# Patient Record
Sex: MALE | Race: WHITE
[De-identification: names, ages, dates, MRNs, and addresses within clinical notes are randomized per-mention and may not be internally consistent; named-entity substitution may affect disease eponyms.]

---

## 2019-07-04 ENCOUNTER — HOSPITAL ENCOUNTER (EMERGENCY)
Dept: HOSPITAL 75 - ER | Age: 29
Discharge: HOME | End: 2019-07-04
Payer: SELF-PAY

## 2019-07-04 VITALS — SYSTOLIC BLOOD PRESSURE: 138 MMHG | DIASTOLIC BLOOD PRESSURE: 71 MMHG

## 2019-07-04 VITALS — BODY MASS INDEX: 22.5 KG/M2 | HEIGHT: 66 IN | WEIGHT: 140 LBS

## 2019-07-04 DIAGNOSIS — F17.200: ICD-10-CM

## 2019-07-04 DIAGNOSIS — Z90.49: ICD-10-CM

## 2019-07-04 DIAGNOSIS — S52.501A: ICD-10-CM

## 2019-07-04 DIAGNOSIS — R40.2362: ICD-10-CM

## 2019-07-04 DIAGNOSIS — R40.2142: ICD-10-CM

## 2019-07-04 DIAGNOSIS — S42.022A: ICD-10-CM
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
## 2019-07-04 DIAGNOSIS — R40.2252: ICD-10-CM

## 2019-07-04 DIAGNOSIS — S62.114A: Primary | ICD-10-CM

## 2019-07-04 DIAGNOSIS — S61.412A: ICD-10-CM

## 2019-07-04 LAB
ALBUMIN SERPL-MCNC: 4.7 GM/DL (ref 3.2–4.5)
ALP SERPL-CCNC: 74 U/L (ref 40–136)
ALT SERPL-CCNC: 40 U/L (ref 0–55)
BASOPHILS # BLD AUTO: 0.1 10^3/UL (ref 0–0.1)
BASOPHILS NFR BLD AUTO: 1 % (ref 0–10)
BASOPHILS NFR BLD MANUAL: 0 %
BILIRUB SERPL-MCNC: 0.4 MG/DL (ref 0.1–1)
BUN/CREAT SERPL: 13
CALCIUM SERPL-MCNC: 9.7 MG/DL (ref 8.5–10.1)
CHLORIDE SERPL-SCNC: 105 MMOL/L (ref 98–107)
CO2 SERPL-SCNC: 27 MMOL/L (ref 21–32)
CREAT SERPL-MCNC: 1.18 MG/DL (ref 0.6–1.3)
EOSINOPHIL # BLD AUTO: 0 10^3/UL (ref 0–0.3)
EOSINOPHIL NFR BLD AUTO: 0 % (ref 0–10)
EOSINOPHIL NFR BLD MANUAL: 0 %
ERYTHROCYTE [DISTWIDTH] IN BLOOD BY AUTOMATED COUNT: 13.1 % (ref 10–14.5)
GFR SERPLBLD BASED ON 1.73 SQ M-ARVRAT: > 60 ML/MIN
GLUCOSE SERPL-MCNC: 124 MG/DL (ref 70–105)
HCT VFR BLD CALC: 48 % (ref 40–54)
HGB BLD-MCNC: 16.4 G/DL (ref 13.3–17.7)
LYMPHOCYTES # BLD AUTO: 0.8 X 10^3 (ref 1–4)
LYMPHOCYTES NFR BLD AUTO: 4 % (ref 12–44)
MANUAL DIFFERENTIAL PERFORMED BLD QL: YES
MCH RBC QN AUTO: 31 PG (ref 25–34)
MCHC RBC AUTO-ENTMCNC: 35 G/DL (ref 32–36)
MCV RBC AUTO: 90 FL (ref 80–99)
MONOCYTES # BLD AUTO: 1.1 X 10^3 (ref 0–1)
MONOCYTES NFR BLD AUTO: 6 % (ref 0–12)
MONOCYTES NFR BLD: 9 %
NEUTROPHILS # BLD AUTO: 17.1 X 10^3 (ref 1.8–7.8)
NEUTROPHILS NFR BLD AUTO: 89 % (ref 42–75)
NEUTS BAND NFR BLD MANUAL: 85 %
NEUTS BAND NFR BLD: 0 %
PLATELET # BLD: 288 10^3/UL (ref 130–400)
PMV BLD AUTO: 9 FL (ref 7.4–10.4)
POTASSIUM SERPL-SCNC: 4.4 MMOL/L (ref 3.6–5)
PROT SERPL-MCNC: 7.4 GM/DL (ref 6.4–8.2)
RBC MORPH BLD: NORMAL
SODIUM SERPL-SCNC: 142 MMOL/L (ref 135–145)
VARIANT LYMPHS NFR BLD MANUAL: 6 %
WBC # BLD AUTO: 19.1 10^3/UL (ref 4.3–11)

## 2019-07-04 PROCEDURE — 73090 X-RAY EXAM OF FOREARM: CPT

## 2019-07-04 PROCEDURE — 96375 TX/PRO/DX INJ NEW DRUG ADDON: CPT

## 2019-07-04 PROCEDURE — 71045 X-RAY EXAM CHEST 1 VIEW: CPT

## 2019-07-04 PROCEDURE — 80053 COMPREHEN METABOLIC PANEL: CPT

## 2019-07-04 PROCEDURE — 96374 THER/PROPH/DIAG INJ IV PUSH: CPT

## 2019-07-04 PROCEDURE — 85027 COMPLETE CBC AUTOMATED: CPT

## 2019-07-04 PROCEDURE — 90471 IMMUNIZATION ADMIN: CPT

## 2019-07-04 PROCEDURE — 73000 X-RAY EXAM OF COLLAR BONE: CPT

## 2019-07-04 PROCEDURE — 73100 X-RAY EXAM OF WRIST: CPT

## 2019-07-04 PROCEDURE — 80320 DRUG SCREEN QUANTALCOHOLS: CPT

## 2019-07-04 PROCEDURE — 96376 TX/PRO/DX INJ SAME DRUG ADON: CPT

## 2019-07-04 PROCEDURE — 73130 X-RAY EXAM OF HAND: CPT

## 2019-07-04 PROCEDURE — 85007 BL SMEAR W/DIFF WBC COUNT: CPT

## 2019-07-04 PROCEDURE — 29125 APPL SHORT ARM SPLINT STATIC: CPT

## 2019-07-04 PROCEDURE — 36415 COLL VENOUS BLD VENIPUNCTURE: CPT

## 2019-07-04 PROCEDURE — 90715 TDAP VACCINE 7 YRS/> IM: CPT
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
## 2019-07-04 NOTE — DIAGNOSTIC IMAGING REPORT
INDICATION: Right wrist fracture, postreduction.



TIME OF EXAM: 7:34 p.m.



COMPARISON: Correlation is made with prior radiographs from

earlier the same evening.



FINDINGS: Wrist is now placed in a fiberglass splint. Distal

radius fracture is again noted. There has been some improvement

in the degree of dorsal displacement of distal radius fracture

fragment although the there does appear to continue to be some

mild displacement. Carpus and metacarpals are unremarkable.



IMPRESSION: Improved alignment of the distal radius fracture,

postreduction and splint placement. 



Dictated by: 



  Dictated on workstation # [id number]
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
## 2019-07-04 NOTE — NUR
PT PLACED IN SLING FOR COMFORT OF THE COLLARBONE.  PT STATES IT IMMEDIATELY 
HELPED THE PAIN AFTER IT WAS PLACED.
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
## 2019-07-04 NOTE — XMS REPORT
[facility]

                             Created on: 2017



Cezar Leone

External Reference #: [id number]

: 1990

Sex: Male



Demographics







                          Address                   [address] [place]  [address]

 

                          Preferred Language        Unknown

 

                          Marital Status            Unknown

 

                          [employer] Affiliation     Unknown

 

                          Race                      Unknown

 

                          Ethnic Group              Unknown





Author







                          Author                    HARISH HERNÁNDEZ

 

                          Organization              [facility]

 

                          Address                   [address] [place]  [address]



 

                          Phone                     [phone]







Care Team Providers







                    Care Team Member Name    Role                Phone

 

                    HARISH HERNÁNDEZ    Unavailable         [phone]







PROBLEMS

Unknown Problems



ALLERGIES







             Substance    Reaction     Event Type    Date         Status

 

             N.K.D.A.     Unknown      Non Drug Allergy    29 Dec, 2016    Unknown







SOCIAL HISTORY

No smoking Hx information available



PLAN OF CARE







                          Activity                  Details









                                         









                          Follow Up                 prn Reason:







VITAL SIGNS







                    Height              65 in               2016

 

                    Weight              127.2 lbs           2016

 

                    Temperature         98.3 degrees Fahrenheit    2016

 

                    Heart Rate          76 bpm              2016

 

                    Respiratory Rate    18                  2016

 

                    BMI                 21.16 kg/m2         2016

 

                    Blood pressure systolic    128 mmHg            2016

 

                    Blood pressure diastolic    78 mmHg             2016







MEDICATIONS

No Known Medications



RESULTS







                Name            Result          Date            Reference Range

 

                INFLUENZA A & B (IN HOUSE)                    2016       

 

                INFLUENZA A     negative                         

 

                INFLUENZA B     negative                         

 

                Control         +                                

 

                Lot #           [id number]                          

 

                Exp date        2018                       







PROCEDURES







                Procedure       Date Ordered    Related Diagnosis    Body Site

 

                INFLUENZA ASSAY W/OPTIC    Dec 29, 2016                     

 

                Office Visit, Est Pt., Level 3    Dec 29, 2016                     







IMMUNIZATIONS

No Known Immunizations

## 2019-07-04 NOTE — XMS REPORT
[facility]

                             Created on: 2018



LeoneCezar

External Reference #: [id number]

: 1990

Sex: Male



Demographics







                          Address                   [address] [place] [id number]

 

                          Preferred Language        Unknown

 

                          Marital Status            Unknown

 

                          Spiritism Affiliation     Unknown

 

                          Race                      Unknown

 

                          Ethnic Group              Unknown





Author







                          Author                    EVIN ALFRED              [facility]

 

                          Address                   Unknown

 

                          Phone                     [phone]







Care Team Providers







                    Care Team Member Name    Role                Phone

 

                    EVIN ALFRED     Unavailable         [phone]







PROBLEMS

Unknown Problems



ALLERGIES

No Known Allergies



ENCOUNTERS







                Encounter       Location        Date            Diagnosis

 

                          [facility]    [address] [id number]

                                        Dental caries K02.9

 

                          [facility]    [address] [place] [phone]

                                        Dental examination Z01.20

 

                          [facility]    [address] [address]

                                        Jaw pain, non-TMJ R68.84

 

                          [facility]    [address] [address]

                          29 Dec, 2016              Fever, unspecified fever cause R50.9 and Viral gastroenteritis A08.4



 

                          [facility]    [address] [address]

                                        Chigger bites B88.0

 

                          [facility]     [address] [address]

                          25 Oct, 2011               







IMMUNIZATIONS

No Known Immunizations



SOCIAL HISTORY

Never Assessed



REASON FOR VISIT

TE-PER DR ALFRED



PLAN OF CARE







                          Activity                  Details









                                         









                          Follow Up                 prn Reason:ANYA







VITAL SIGNS







                    Height              65 in               2017

 

                    Blood pressure systolic    120 mmHg            2017

 

                    Blood pressure diastolic    81 mmHg             2017







MEDICATIONS







        Medication    Instructions    Dosage    Frequency    Start Date    End Date    Duration    Status



 

          [place] 7.5-325 MG    Orally every 6 hrs    1 tablet as needed    6h                       

                                        Active

 

        Calamine                                                    Not-Taking

 

        Benadryl Allergy 25 MG    Orally every 6 hrs    1 tablet as needed    6h                              Not-Taking



 

           Clindamycin HCl 300 MG    Orally every 6 hrs    1 capsule    6h                              10 days                   Active

 

                    Clindamycin HCl 300 MG                        2 capsules at once then one every 8 hours until gone 

                              10 days      Active

 

           [place] 7.5-325 MG    Orally every 6 hrs    1 tablet as needed    6h                              3 days                    Active

 

           Amoxicillin 500 mg    Orally every 12 hrs    2 capsules    12h                              10 days                   Active

 

           Tramadol HCl 50 mg    Orally every 6 hrs    1 tablet as needed    6h               

                                                    Active







RESULTS

No Results



PROCEDURES







                Procedure       Date Ordered    Result          Body Site

 

                EXTRAC ERUPTED TOOTH/EXPOSED ROOT    2017                     







INSTRUCTIONS





MEDICATIONS ADMINISTERED

No Known Medications



MEDICAL (GENERAL) HISTORY







                    Type                Description         Date

 

                    Surgical History    appendectomy        

 

                    Hospitalization History    for appe surgery

## 2019-07-10 ENCOUNTER — HOSPITAL ENCOUNTER (OUTPATIENT)
Dept: HOSPITAL 75 - ORTHO | Age: 29
End: 2019-07-10
Attending: ORTHOPAEDIC SURGERY
Payer: SELF-PAY
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
## 2019-07-10 DIAGNOSIS — S42.022A: ICD-10-CM

## 2019-07-10 DIAGNOSIS — S52.501A: ICD-10-CM

## 2019-07-10 DIAGNOSIS — S62.114A: Primary | ICD-10-CM

## 2019-07-10 PROCEDURE — 29065 APPL CST SHO TO HAND LNG ARM: CPT

## 2021-08-13 ENCOUNTER — HOSPITAL ENCOUNTER (EMERGENCY)
Dept: HOSPITAL 75 - ER | Age: 31
Discharge: HOME | End: 2021-08-13
Payer: COMMERCIAL

## 2021-08-13 VITALS — HEIGHT: 66.02 IN | WEIGHT: 143.3 LBS | BODY MASS INDEX: 23.03 KG/M2

## 2021-08-13 VITALS — DIASTOLIC BLOOD PRESSURE: 76 MMHG | SYSTOLIC BLOOD PRESSURE: 121 MMHG

## 2021-08-13 DIAGNOSIS — S81.811A: Primary | ICD-10-CM

## 2021-08-13 DIAGNOSIS — F17.210: ICD-10-CM
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
## 2021-08-13 NOTE — ED LOWER EXTREMITY
General


Chief Complaint:  Laceration


Stated Complaint:  RIGHT CALF LACERATIONS


Source:  patient


Exam Limitations:  no limitations





History of Present Illness


Date Seen by Provider:  Aug 13, 2021


Time Seen by Provider:  20:52


Initial Comments


To ER with lacerations x2 to the lateral aspect of the right calf after cutting 

them on a ceramic piece of culvert.  Tetanus vaccine is up-to-date.


Onset:  just prior to arrival


Severity:  moderate


Pain/Injury Location:  right leg


Method of Injury:  direct blow


Modifying Factors:  Worse With Movement





Allergies and Home Medications


Allergies


Coded Allergies:  


     No Known Drug Allergies (Unverified , 5/16/16)





Home Medications


Cephalexin 500 Mg Capsule, 500 MG PO BID


   Prescribed by: SHIRA VEE on 7/4/19 1936


Oxycodone HCl/Acetaminophen 1 Each Tablet, 1-2 TAB PO Q4H PRN for PAIN-MODERATE


   Prescribed by: AVRIL GRAY on 7/4/19 1842





Patient Home Medication List


Home Medication List Reviewed:  Yes





Review of Systems


Constitutional:  see HPI


EENTM:  see HPI


Respiratory:  no symptoms reported


Cardiovascular:  no symptoms reported


Genitourinary:  no symptoms reported


Musculoskeletal:  no symptoms reported


Skin:  see HPI


Psychiatric/Neurological:  No Symptoms Reported





Past Medical-Social-Family Hx


Patient Social History


Tobacco Use?:  Yes


Tobacco type used:  Cigarettes


Smoking Status:  Current Everyday Smoker


Use of E-Cig and/or Vaping dev:  No


Substance use?:  No


Alcohol Use?:  No


Pt feels they are or have been:  No





Immunizations Up To Date


Influenza Vaccine Up-to-Date:  No; Not Current





Past Medical History


Surgeries:  Yes


Appendectomy


Respiratory:  No


Cardiac:  No


Neurological:  No


Reproductive Disorders:  No


Gastrointestinal:  No


Musculoskeletal:  No


Endocrine:  No


HEENT:  No


Cancer:  No


Psychosocial:  No


Integumentary:  No





Physical Exam


Vital Signs


Capillary Refill : Less Than 3 Seconds


Height, Weight, BMI


Height: 5'6.00"


Weight: 140lbs. oz. 63.142158kx;  BMI


Method:Stated


General Appearance:  WD/WN, no apparent distress


HEENT:  PERRL/EOMI, normal ENT inspection


Respiratory:  no respiratory distress, no accessory muscle use


Hips:  bilateral hip non-tender, bilateral hip normal inspection, bilateral hip 

normal range of motion


Legs:  right leg other (There are 2 separate lacerations to the right lateral 

lower leg.  The more superior laceration is 2 cm and the more inferior 

laceration is 3 cm.  Depth is to the subcutaneous tissue.)


Knees:  bilateral knee non-tender, bilateral knee normal inspection, bilateral 

knee normal range of motion


Ankles:  bilateral ankle non-tender, bilateral ankle normal inspection, 

bilateral ankle normal range of motion


Neurologic/Psychiatric:  alert, normal mood/affect, oriented x 3


Skin:  normal color, warm/dry





Procedures/Interventions


Patient Education:  Explained Benefits, Explained Risks, Pt. Ack. Understanding


Breath Sounds per Auscultation:  Clear


Heart Sounds per Auscultation:  Regular


Airway Exam:  Mouth opens >2 fingers, Neck Full Range of Motion, Visulation of 

Uvula


Sedation Adminstration Time:  1916


Re-examination Time:  2000





   Suture Size:  4-0





Departure


Communication (Admissions)


Procedure note:


The laceration was totaled 5 cm.  The more superior laceration was anesthetized 

with 2 mL of 1% lidocaine without epinephrine buffered with sodium bicarbonate. 

A continuous suture size 4-0 Prolene was used to close the wound after 

irrigating thoroughly with chlorhexidine/saline solution.  The more inferior 

laceration with depth to subcutaneous tissue was 3 cm and was anesthetized with 

3 mL of 1% lidocaine without epinephrine and buffered with sodium bicarbonate.  

This was closed with a continuous suture size 4-0 Prolene as well.





Impression





   Primary Impression:  


   Laceration of leg


Disposition:  01 HOME, SELF-CARE


Condition:  Stable





Departure-Patient Inst.


Decision time for Depature:  20:55


Referrals:  


[facility] (PCP/Family)


Primary Care Physician


Patient Instructions:  Laceration Repair With Stitches ED





Add. Discharge Instructions:  


1.  You can shower letting water run over this starting tonight.  Keep it 

covered while at work or sleeping.  Return to ER for any sign of infection such 

as redness swelling or puslike drainage.  Return to ER in about 7 to 10 days to 

have the stitches removed.





All discharge instructions reviewed with patient and/or family. Voiced 

understanding.


Work/School Note:  Work Release Form   Date Seen in the Emergency Department:  

Aug 13, 2021


   Return to Work:  Aug 15, 2021











CHAS BECK             Aug 13, 2021 20:56